# Patient Record
Sex: FEMALE | Race: WHITE
[De-identification: names, ages, dates, MRNs, and addresses within clinical notes are randomized per-mention and may not be internally consistent; named-entity substitution may affect disease eponyms.]

---
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
## 2024-10-12 ENCOUNTER — HOSPITAL ENCOUNTER (OUTPATIENT)
Facility: CLINIC | Age: 19
Discharge: HOME | End: 2024-10-12
Attending: FAMILY MEDICINE
Payer: COMMERCIAL

## 2024-10-12 ENCOUNTER — APPOINTMENT (OUTPATIENT)
Dept: RADIOLOGY | Age: 19
End: 2024-10-12
Attending: NURSE PRACTITIONER
Payer: COMMERCIAL

## 2024-10-12 VITALS
TEMPERATURE: 97.7 F | OXYGEN SATURATION: 98 % | SYSTOLIC BLOOD PRESSURE: 103 MMHG | HEART RATE: 60 BPM | DIASTOLIC BLOOD PRESSURE: 53 MMHG

## 2024-10-12 DIAGNOSIS — M25.572 ACUTE LEFT ANKLE PAIN: Primary | ICD-10-CM

## 2024-10-12 DIAGNOSIS — S93.402A SPRAIN OF LEFT ANKLE, UNSPECIFIED LIGAMENT, INITIAL ENCOUNTER: ICD-10-CM

## 2024-10-12 PROCEDURE — 99204 OFFICE O/P NEW MOD 45 MIN: CPT | Performed by: NURSE PRACTITIONER

## 2024-10-12 PROCEDURE — 73630 X-RAY EXAM OF FOOT: CPT | Mod: LT | Performed by: NURSE PRACTITIONER

## 2024-10-12 PROCEDURE — 73610 X-RAY EXAM OF ANKLE: CPT | Mod: LT | Performed by: NURSE PRACTITIONER

## 2024-10-12 PROCEDURE — S9083 URGENT CARE CENTER GLOBAL: HCPCS | Performed by: NURSE PRACTITIONER

## 2024-10-12 ASSESSMENT — ENCOUNTER SYMPTOMS
RESPIRATORY NEGATIVE: 1
GASTROINTESTINAL NEGATIVE: 1
EYES NEGATIVE: 1
HEMATOLOGIC/LYMPHATIC NEGATIVE: 1
CARDIOVASCULAR NEGATIVE: 1
NEUROLOGICAL NEGATIVE: 1
CONSTITUTIONAL NEGATIVE: 1
PSYCHIATRIC NEGATIVE: 1
MUSCULOSKELETAL NEGATIVE: 1
ALLERGIC/IMMUNOLOGIC NEGATIVE: 1
ENDOCRINE NEGATIVE: 1

## 2024-10-12 NOTE — DISCHARGE INSTRUCTIONS
Wear cam walker boot while ambulating/weight bearing until you are seen by an orthopaedic specialist  Rest affected limb, no heavy pushing, lifting, pulling, repetitive movements  Elevate limb to help with swelling, apply ice intermittently 20 mins and off  You may take over the counter Ibuprofen(advil/motrin) or naproxen (alleve) with food as needed for pain relief  May alternate with acetaminophen ( tylenol). Take as directed on the bottle.  You must follow up with an Orthopaedic specialist   Please make an appointment with the number provided   Seek emergent care for any new or worsening symptoms.

## 2024-10-12 NOTE — ED ATTESTATION NOTE
The patient was solely evaluated and managed by the nurse practitioner.  I was present in the office and available for questions/concerns.  I have reviewed the diagnosis and treatment plan.       Cindi Salazar DO  10/12/24 1926

## 2024-10-12 NOTE — ED PROVIDER NOTES
Emergency Medicine Note  HPI   HISTORY OF PRESENT ILLNESS     HPI Pt is a 19 year old female, who presents with left ankle pain s/p fall last night. Denies striking head. Admits to prior sprain in the past but no fracture. No loss of ROM. No numbness, tingling, paresthesias, weakness. No other sustained trauma. Denies prior history of injuries. Able to weight bear. Took motrin with some relief.       Patient History   PAST HISTORY     Reviewed from Nursing Triage:       No past medical history on file.    Past Surgical History   Procedure Laterality Date    Hand surgery      Reduction mammaplasty         No family history on file.    Social History     Tobacco Use    Smoking status: Some Days     Types: Cigarettes   Substance Use Topics    Alcohol use: Yes    Drug use: Yes     Types: Marijuana         Review of Systems   REVIEW OF SYSTEMS     Review of Systems   Constitutional: Negative.    HENT: Negative.     Eyes: Negative.    Respiratory: Negative.     Cardiovascular: Negative.    Gastrointestinal: Negative.    Endocrine: Negative.    Genitourinary: Negative.    Musculoskeletal: Negative.         Left ankle pain+   Skin: Negative.    Allergic/Immunologic: Negative.    Neurological: Negative.    Hematological: Negative.    Psychiatric/Behavioral: Negative.     All other systems reviewed and are negative.        VITALS     ED Vitals      Date/Time Temp Pulse Resp BP SpO2 [facility]   10/12/24 1521 36.5 °C (97.7 °F) 60 -- 103/53 98 % JH                         Physical Exam   PHYSICAL EXAM     Physical Exam  Vitals reviewed.   Constitutional:       Appearance: Normal appearance.   HENT:      Head: Normocephalic.      Nose: Nose normal.      Mouth/Throat:      Mouth: Mucous membranes are moist.   Eyes:      Pupils: Pupils are equal, round, and reactive to light.   Pulmonary:      Effort: Pulmonary effort is normal.   Musculoskeletal:      Left ankle: Swelling present. No deformity, ecchymosis or lacerations. Tenderness  present. Normal range of motion. Anterior drawer test negative. Normal pulse.      Left Achilles Tendon: Normal. No tenderness or defects. Rae's test negative.      Left foot: Tenderness present.      Comments: Mild anterolateral swelling and bony TTP to lateral and anterior aspect. Mild 5th proximal MT.  No calf TTP. No overlying erythema, ecchymosis, warmth, red streaking. Skin intact. N/V intact. Palpable pulses. <2 cap refill. Full passive ROM.    Neurological:      Mental Status: She is alert.           PROCEDURES     Procedures     DATA     X-RAY ANKLE LEFT 3+ VIEWS  Order: [id number]   Status: Final result       Visible to patient: Yes (not seen)       Next appt: None       Linked study orders: X-RAY FOOT LEFT 3+ VIEWS (Order: [id number])    0 Result Notes  Details    Reading Physician Reading Date Result Priority   Shireen Fabian MD  [phone] 10/12/2024      Narrative & Impression  CLINICAL HISTORY:    left anterolateral ankle tenderness post fall     COMMENT:   3 views of the left ankle and 3 views of the left foot are compared  to 9/16/2023.     No acute fracture or dislocation is identified. Joint spaces appear within  normal limits. The Lisfranc interval is within normal limits. The base of the  fifth metatarsal appears intact.  The ankle mortise appears intact. Bone mineralization appears within normal  limits. An ankle joint effusion is suggested.     --  IMPRESSION:     No evidence of acute fracture or dislocation. Ankle joint effusion suggested.                                        ED Course & MDM   MDM / ED COURSE / CLINICAL IMPRESSION / DISPO     Medical Decision Making    Left ankle pain suggestive of sprain   XR revealed no evidence of acute fracture or dislocation  Advised RICE therapy  Placed on cam walker boot, educated on proper use and wean as tolerated, can use for 1-2 weeks then wean off while weight bearing   Recc OTC Ibuprofen or acetaminophen PRN for pain relief   Referred  to Ortho/PCP if symptoms persists   Return and ED precautions discussed   Pt agreed with plan of care and voiced understanding.       Clinical Impression      Acute left ankle pain   Sprain of left ankle, unspecified ligament, initial encounter     _________________       ED Disposition   Discharge                       Eduarda Hess CRNP  10/12/24 2826

## 2024-10-16 ENCOUNTER — TELEPHONE (OUTPATIENT)
Dept: ORTHOPEDICS | Facility: CLINIC | Age: 19
End: 2024-10-16
Payer: COMMERCIAL

## 2024-10-16 NOTE — TELEPHONE ENCOUNTER
Called patient regarding referral order to schedule appointment.  Call Outcome: No answer, left voicemail with scheduling information.

## 2024-10-27 ENCOUNTER — HOSPITAL ENCOUNTER (EMERGENCY)
Facility: HOSPITAL | Age: 19
Discharge: HOME | End: 2024-10-27
Attending: STUDENT IN AN ORGANIZED HEALTH CARE EDUCATION/TRAINING PROGRAM | Admitting: STUDENT IN AN ORGANIZED HEALTH CARE EDUCATION/TRAINING PROGRAM
Payer: COMMERCIAL

## 2024-10-27 ENCOUNTER — HOSPITAL ENCOUNTER (OUTPATIENT)
Facility: CLINIC | Age: 19
Discharge: LEFT BEFORE DISCHARGE | End: 2024-10-27
Payer: COMMERCIAL

## 2024-10-27 VITALS
OXYGEN SATURATION: 98 % | RESPIRATION RATE: 15 BRPM | HEIGHT: 72 IN | HEART RATE: 74 BPM | SYSTOLIC BLOOD PRESSURE: 136 MMHG | TEMPERATURE: 98 F | DIASTOLIC BLOOD PRESSURE: 60 MMHG | BODY MASS INDEX: 29.12 KG/M2 | WEIGHT: 215 LBS

## 2024-10-27 DIAGNOSIS — S06.0X0A CONCUSSION WITHOUT LOSS OF CONSCIOUSNESS, INITIAL ENCOUNTER: ICD-10-CM
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
## 2024-10-27 PROCEDURE — 99283 EMERGENCY DEPT VISIT LOW MDM: CPT

## 2024-10-27 PROCEDURE — 63700000 HC SELF-ADMINISTRABLE DRUG

## 2024-10-27 RX ORDER — ONDANSETRON 4 MG/1
4 TABLET, ORALLY DISINTEGRATING ORAL ONCE
Status: COMPLETED | OUTPATIENT
Start: 2024-10-27 | End: 2024-10-27

## 2024-10-27 RX ORDER — ONDANSETRON 4 MG/1
4 TABLET, ORALLY DISINTEGRATING ORAL EVERY 8 HOURS PRN
Qty: 8 TABLET | Refills: 0 | Status: SHIPPED | OUTPATIENT
Start: 2024-10-27

## 2024-10-27 RX ADMIN — ONDANSETRON 4 MG: 4 TABLET, ORALLY DISINTEGRATING ORAL at 15:12

## 2024-10-27 NOTE — ED NOTES
Discharge instructions reviewed.  Pt encouraged to follow up with PCP and concussion clinic.  Symptoms reviewed to return to ED.  Medication to be picked up at pharmacy reviewed and education provided.  Pt verbalized understanding of discharge instructions and ambulated to exit.      Winifred Hughes, RN  10/27/24 1339

## 2024-10-27 NOTE — DISCHARGE INSTRUCTIONS
Please return to the emergency department immediately if you develop worst headache of your life, fever, chills, chest pain, shortness of breath, difficulty breathing, new weakness or sensory deficits in the lower extremity, if you pass out or if you experience any other symptom that is concerning to you.    You may take Motrin or Tylenol as per the instructions on the label.    You were prescribed Zofran, you may take this medication as per the instructions label as needed for nausea/vomiting.    Please  follow-up with your primary care provider for reevaluation.    Please call the concussion clinic for follow-up/reevaluation and ongoing management of your symptoms.
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
## 2024-10-28 ASSESSMENT — ENCOUNTER SYMPTOMS
NAUSEA: 1
FEVER: 0
ARTHRALGIAS: 0
DIZZINESS: 0
CHILLS: 0
WOUND: 0
SHORTNESS OF BREATH: 0
HEADACHES: 1
LIGHT-HEADEDNESS: 1
ABDOMINAL PAIN: 0
VOMITING: 0
MYALGIAS: 0

## 2024-10-31 ENCOUNTER — APPOINTMENT (EMERGENCY)
Dept: RADIOLOGY | Facility: HOSPITAL | Age: 19
End: 2024-10-31
Payer: COMMERCIAL

## 2024-10-31 ENCOUNTER — HOSPITAL ENCOUNTER (EMERGENCY)
Facility: HOSPITAL | Age: 19
Discharge: HOME | End: 2024-10-31
Attending: EMERGENCY MEDICINE | Admitting: EMERGENCY MEDICINE
Payer: COMMERCIAL

## 2024-10-31 VITALS
OXYGEN SATURATION: 97 % | WEIGHT: 215 LBS | BODY MASS INDEX: 29.12 KG/M2 | TEMPERATURE: 97.7 F | HEART RATE: 76 BPM | DIASTOLIC BLOOD PRESSURE: 68 MMHG | RESPIRATION RATE: 17 BRPM | HEIGHT: 72 IN | SYSTOLIC BLOOD PRESSURE: 139 MMHG

## 2024-10-31 DIAGNOSIS — R45.89 DYSPHORIC MOOD: ICD-10-CM

## 2024-10-31 DIAGNOSIS — S06.0X0A CONCUSSION WITHOUT LOSS OF CONSCIOUSNESS, INITIAL ENCOUNTER: Primary | ICD-10-CM

## 2024-10-31 LAB
ALBUMIN SERPL-MCNC: 5 G/DL (ref 3.5–5.7)
ALP SERPL-CCNC: 85 IU/L (ref 34–125)
ALT SERPL-CCNC: 10 IU/L (ref 7–52)
AMPHET UR QL SCN: NOT DETECTED
ANION GAP SERPL CALC-SCNC: 10 MEQ/L (ref 3–15)
APAP SERPL-MCNC: <0.1 UG/ML (ref 10–30)
AST SERPL-CCNC: 16 IU/L (ref 13–39)
BARBITURATES UR QL SCN: NOT DETECTED
BASOPHILS # BLD: 0.05 K/UL (ref 0.01–0.1)
BASOPHILS NFR BLD: 0.7 %
BENZODIAZ UR QL SCN: NOT DETECTED
BILIRUB SERPL-MCNC: 1 MG/DL (ref 0.3–1.2)
BUN SERPL-MCNC: 14 MG/DL (ref 7–25)
CALCIUM SERPL-MCNC: 9.4 MG/DL (ref 8.6–10.3)
CANNABINOIDS UR QL SCN: NOT DETECTED
CHLORIDE SERPL-SCNC: 103 MEQ/L (ref 98–107)
CO2 SERPL-SCNC: 27 MEQ/L (ref 21–31)
COCAINE UR QL SCN: NOT DETECTED
CREAT SERPL-MCNC: 0.6 MG/DL (ref 0.6–1.2)
DIFFERENTIAL METHOD BLD: ABNORMAL
EGFRCR SERPLBLD CKD-EPI 2021: >60 ML/MIN/1.73M*2
EOSINOPHIL # BLD: 0.1 K/UL (ref 0.04–0.36)
EOSINOPHIL NFR BLD: 1.5 %
ERYTHROCYTE [DISTWIDTH] IN BLOOD BY AUTOMATED COUNT: 13.5 % (ref 11.7–14.4)
ETHANOL SERPL-MCNC: <10 MG/DL
FENTANYL URINE SCR: NOT DETECTED
GLUCOSE SERPL-MCNC: 119 MG/DL (ref 70–99)
HCT VFR BLD AUTO: 44.1 % (ref 35–45)
HGB BLD-MCNC: 13.8 G/DL (ref 11.8–15.7)
IMM GRANULOCYTES # BLD AUTO: 0.02 K/UL (ref 0–0.08)
IMM GRANULOCYTES NFR BLD AUTO: 0.3 %
LYMPHOCYTES # BLD: 1.86 K/UL (ref 1.2–3.5)
LYMPHOCYTES NFR BLD: 27.8 %
MCH RBC QN AUTO: 27.5 PG (ref 28–33.2)
MCHC RBC AUTO-ENTMCNC: 31.3 G/DL (ref 32.2–35.5)
MCV RBC AUTO: 87.8 FL (ref 83–98)
MONOCYTES # BLD: 0.32 K/UL (ref 0.28–0.8)
MONOCYTES NFR BLD: 4.8 %
NEUTROPHILS # BLD: 4.35 K/UL (ref 1.7–7)
NEUTS SEG NFR BLD: 64.9 %
NRBC BLD-RTO: 0 %
OPIATES UR QL SCN: NOT DETECTED
PCP UR QL SCN: NOT DETECTED
PLATELET # BLD AUTO: 249 K/UL (ref 150–369)
PMV BLD AUTO: 10.9 FL (ref 9.4–12.3)
POTASSIUM SERPL-SCNC: 4 MEQ/L (ref 3.5–5.1)
PROT SERPL-MCNC: 7.6 G/DL (ref 6–8.2)
RBC # BLD AUTO: 5.02 M/UL (ref 3.93–5.22)
SALICYLATES SERPL-MCNC: <1.5 MG/DL
SODIUM SERPL-SCNC: 140 MEQ/L (ref 136–145)
WBC # BLD AUTO: 6.7 K/UL (ref 3.8–10.5)

## 2024-10-31 PROCEDURE — 99284 EMERGENCY DEPT VISIT MOD MDM: CPT | Mod: 25

## 2024-10-31 PROCEDURE — 70450 CT HEAD/BRAIN W/O DYE: CPT

## 2024-10-31 PROCEDURE — 96375 TX/PRO/DX INJ NEW DRUG ADDON: CPT

## 2024-10-31 PROCEDURE — 3E033GC INTRODUCTION OF OTHER THERAPEUTIC SUBSTANCE INTO PERIPHERAL VEIN, PERCUTANEOUS APPROACH: ICD-10-PCS | Performed by: EMERGENCY MEDICINE

## 2024-10-31 PROCEDURE — G0480 DRUG TEST DEF 1-7 CLASSES: HCPCS

## 2024-10-31 PROCEDURE — 36415 COLL VENOUS BLD VENIPUNCTURE: CPT

## 2024-10-31 PROCEDURE — 63600000 HC DRUGS/DETAIL CODE: Mod: JZ

## 2024-10-31 PROCEDURE — 85025 COMPLETE CBC W/AUTO DIFF WBC: CPT

## 2024-10-31 PROCEDURE — 96374 THER/PROPH/DIAG INJ IV PUSH: CPT

## 2024-10-31 PROCEDURE — 80307 DRUG TEST PRSMV CHEM ANLYZR: CPT

## 2024-10-31 PROCEDURE — 80053 COMPREHEN METABOLIC PANEL: CPT

## 2024-10-31 RX ORDER — DIPHENHYDRAMINE HCL 50 MG/ML
25 VIAL (ML) INJECTION ONCE
Status: COMPLETED | OUTPATIENT
Start: 2024-10-31 | End: 2024-10-31

## 2024-10-31 RX ORDER — METOCLOPRAMIDE HYDROCHLORIDE 5 MG/ML
10 INJECTION INTRAMUSCULAR; INTRAVENOUS ONCE
Status: COMPLETED | OUTPATIENT
Start: 2024-10-31 | End: 2024-10-31

## 2024-10-31 RX ADMIN — DIPHENHYDRAMINE HYDROCHLORIDE 25 MG: 50 INJECTION INTRAMUSCULAR; INTRAVENOUS at 19:42

## 2024-10-31 RX ADMIN — METOCLOPRAMIDE 10 MG: 5 INJECTION, SOLUTION INTRAMUSCULAR; INTRAVENOUS at 19:43

## 2024-10-31 ASSESSMENT — COGNITIVE AND FUNCTIONAL STATUS - GENERAL
SPEECH: REGULAR
CONCENTRATION: WNL
EYE_CONTACT: WNL
ORIENTATION: FULLY ORIENTED
PERCEPTUAL FUNCTION: NORMAL
SLEEP_WAKE_CYCLE: INCREASED
APPETITE: NO CHANGE
INSIGHT: INTACT
JUDGEMENT: INTACT
REMOTE MEMORY: WNL
ATTENTION: WNL
MOOD: DEPRESSED
THOUGHT_CONTENT: APPROPRIATE
AFFECT: FLAT
RECENT MEMORY: WNL
DELUSIONS: NONE OR AGE APPROPRIATE
AROUSAL LEVEL: ALERT
APPEARANCE: WELL GROOMED
PSYCHOMOTOR FUNCTIONING: WNL
IMPULSE CONTROL: IMPAIRED, MINIMALLY
LIBIDO: NO CHANGE
THOUGHT_PROCESS: WNL

## 2024-10-31 ASSESSMENT — ENCOUNTER SYMPTOMS
FEVER: 0
CHILLS: 0
HEADACHES: 1
DIZZINESS: 0
ABDOMINAL PAIN: 0
VOMITING: 0
SPEECH DIFFICULTY: 0
NAUSEA: 1
NUMBNESS: 0
LIGHT-HEADEDNESS: 0
PHOTOPHOBIA: 1
SHORTNESS OF BREATH: 0

## 2024-10-31 NOTE — Clinical Note
Arlet Bush was seen and treated in our emergency department on 10/31/2024.  Arlet Bush may return to school on 11/11/2024.  She cannot return to school until she is evaluated by the [facility] program    If you have any questions or concerns, please don't hesitate to call.      Kira Vences PA C

## 2024-10-31 NOTE — ED ATTESTATION NOTE
I have personally seen and examined the patient.  I reviewed and agree with physician assistant / nurse practitioner’s assessment and plan of care, with the following exceptions: None    I personally performed the key components of the encounter and provided a substantive portion of the care and medical decision making    My examination, assessment, and plan of care of Arlet Bush is as follows:     Pt hit her head 5 days ago. Since then, co headaches, trouble concentrating, brain fog, nausea.   Sent over from [facility]  Exam: awake, alert. NAD. NCAT. CN intact.  RRR, CTA, abd soft, nt.       Sterious, Keith CRUZ MD  10/31/24 2898
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
## 2024-11-01 NOTE — DISCHARGE INSTRUCTIONS
Pt was recommended to follow up with her therapist Friday for their 1st session and then after she will attend the session x2 a week. Also writer provided crisis listing, local providers such as therapists and psychiatrists resources. Pt completed the safety plan.     Please schedule an appointment with the [place] concussion program for further evaluation and management.    Return to the ED for worsening of symptoms or any problems or concerns.  It is very important to follow up with your healthcare provider for re-evaluation.

## 2024-11-12 ENCOUNTER — TRANSCRIBE ORDERS (OUTPATIENT)
Dept: REGISTRATION | Facility: REHABILITATION | Age: 19
End: 2024-11-12

## 2024-11-12 DIAGNOSIS — S06.0X0S CONCUSSION WITHOUT LOSS OF CONSCIOUSNESS, SEQUELA (CMS/HCC): Primary | ICD-10-CM

## 2024-11-12 DIAGNOSIS — R51.9 HEADACHE: ICD-10-CM

## 2024-11-18 ENCOUNTER — HOSPITAL ENCOUNTER (OUTPATIENT)
Dept: OCCUPATIONAL THERAPY | Age: 19
Setting detail: THERAPIES SERIES
Discharge: HOME | End: 2024-11-18
Attending: PHYSICAL MEDICINE & REHABILITATION
Payer: COMMERCIAL

## 2024-11-18 DIAGNOSIS — S06.0X0S CONCUSSION WITHOUT LOSS OF CONSCIOUSNESS, SEQUELA (CMS/HCC): Primary | ICD-10-CM

## 2024-11-18 DIAGNOSIS — R51.9 HEADACHE: ICD-10-CM

## 2024-11-18 DIAGNOSIS — Z78.9 IMPAIRED INSTRUMENTAL ACTIVITIES OF DAILY LIVING (IADL): ICD-10-CM

## 2024-11-18 PROCEDURE — 97166 OT EVAL MOD COMPLEX 45 MIN: CPT | Mod: GO

## 2024-11-18 RX ORDER — BUSPIRONE HYDROCHLORIDE 10 MG/1
10 TABLET ORAL 2 TIMES DAILY
COMMUNITY

## 2024-11-18 NOTE — PROGRESS NOTES
Occupational Therapy Evaluation    [facility] OP Therapy Fax: [phone]    OT EVALUATION FOR OUTPATIENT THERAPY    Patient: Arlet Bush   MRN: [id number]  : 2005 19 y.o.    Referring Physician: Judson Larsen DO  Date of Visit: 2024    Certification Dates:  24 through 24    Recommended Frequency & Duration:  1 time/week for up to 1 week        Diagnosis:   1. Concussion without loss of consciousness, sequela (CMS/HCC)    2. Headache    3. Impaired instrumental activities of daily living (IADL)        History of Present Illness:  Concussion     Chief Complaints:   Chief Complaint   Patient presents with    OT Initial Eval    Concussion     Difficulty Performing Work/school Tasks       Precautions:      Past Medical History: No past medical history on file.    Past Surgical History:   Past Surgical History   Procedure Laterality Date    Hand surgery      Reduction mammaplasty      Reduction mammaplasty      Tendon repair         LEARNING ASSESSMENT    Assessment completed: Yes    Learner name:  Arlet     Learner: Patient    Learning Barriers:  Learning barriers: No Barriers    Preferred Language: English     Needed: No    Education Provided:   Method: Discussion  Readiness: acceptance  Response: Verbalizes understanding      CO-LEARNER ASSESSMENT:    Completed: No      Welcome letter discussed: Yes Patient provided with Welcome Letter, which includes attendance policy. Provided education regarding cancellation and no-show policy. Education regarding the importance of participation and regular attendance to maximize goal attainment.     OBJECTIVE MEASUREMENTS/DATA:    Time In Session:  Start Time: 0710  Stop Time: 0800  Time Calculation (min): 50 min   Assessment - 24 0709          Assessment    Plan of Care reviewed and patient/family in agreement Yes     System Pathology/Pathophysiology Noted neuromuscular     Functional Limitations in Following Categories  community/leisure;school     Problem List: Occupational Therapy functional vision;impaired cognition     Demonstrates Need for Referral to Another Service: Occupational Therapy neuropsychology services     Actions taken Discussed potential recommendation of Neuro Psyh services due to pt report of continued cognitive deficits. Pt educated on recommendation to follow up with PCP and/or referring provider for futher evaluation pending progress of PCS symptoms following upcoming completion of college finals.     Clinical Assessment Arlet Bush is a 19 year old female referred to [facility] OP OT services by Dr. Larsen following a concussion diagnosis occurring on 10/26/24. Pt is a full time student at [employer]. 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 Pt has returned to courses with accommodations. Pt reports limited reading tolerance of 10 minutes with difficulty processing after this duration. Pt denies concern with screen use. Subjectively, assessed on the SCOAT6 pt reporting worsening of symptoms with physical and mental activity. Pt reporting a total of 19/23 symptoms and 64/138 symptoms severity. Pt reporting 78% feeling perfectly normal. Pt shares main goals to improve complex thinking and complex processing with school tasks. Objectively, pt demonstrates WNL score of visual reaction speed of 0.75 seconds assessed on the Dynavision board (NORM = 0.75 seconds). Pt demonstrates WNL of short saccadic speed both horizontal and vertical. WNL of Mohamud Devick assessment of 44.61 seconds (NORM = 0.52 seconds).  Due to time constraints unable to complete formal oculormotor AROM assessment and eye teaming evaluation. Upon recommendation and discussion, OP OT services is not recommended at this time due to pt schedule barriers. Pt will be away for the full week next week and will return home in the middle of December to [place]. Pt will be preparing for finals through the initial weeks of December and attendance to OP therapies  is limited. Education provided on Neuro Psyh evaluation for further evaluation of Cognitive deficits and potential extension of accommodations for school. Pt verbalizes agreement with recommendation at this time.     Plan and Recommendations Due to pt schedule barries OP OT services is not recommended at this time.                    General Information - 11/18/24 0709          Session Details    Document Type initial evaluation     Mode of Treatment individual therapy        General Information    Onset of Illness/Injury or Date of Surgery 10/26/24     Referring Physician Dr. Larsen     History of present illness/functional impairment Arlet Bush is a 19 year old female referred to [facility] OP OT services by Dr. Larsen following a concussion diagnosis occurring on 10/26/24. Pt presented to [facility] ED on 10/27/24  and 10/31/24. Pt reporting had hit her head on a metal furnace (fire place) and against the wall while drinking. Pt reports did not lose consciousness. Pt reports returned to the ED on 10/31/24 following worsening symptoms of headache and nausea. [facility] [address] [place] who diagnosis pt with a concussion. Per medical history following head injury pt reported increased thoughts of self-harm and suicidal ideation without intention. Patient reported history of this in the past however has not had any self-harm in over 1 year. On evaluation today, pt reports improvement in thoughts and denies plan. Pt with history of chronic paraesthesias and is followed by neurology Dr. Kaiser Francisco at [facility] in [place] since 2023. Pt with several MRIs in the past. Pt presents to OP OT evaluation today to improve ongoing PCS symptoms.     Patient/Family/Caregiver Comments/Observations Pt presented 10 minutes late to OT evaluation. Pt presented with partner, Grant.        Behavioral Health Related Communication    Suicidal Ideation No                    Pain/Vitals - 11/18/24 3166           Pain Assessment    Currently in pain No/Denies                    OT - 11/18/24 0709          Occupational Therapy Encounter Type Details    Occupational Therapy Specialty [facility] OT        OT Frequency and Duration    Frequency of treatment 1 time/week     OT Duration 1 week     OT Cert From 11/18/24     OT Cert To 11/18/24     Date OT POC was sent to provider 11/18/24     Signed OT Plan of Care received?  No                    Falls/Food Screening - 11/18/24 0709          Initial Falls Assessment    One or more falls in the last year Yes     How many times 2 or more   Pt reports history of Paresthesia. Pt shares is being seen by Neurology Dr. Kaiser Francisco. Pt reports causes falls, poor balance, decreased UE/LE sensation.    Was the patient injured in any fall Yes   Ankle sprain, pt denies head injury from falls       Food Insecurity    Within the past 12 months, you worried that your food would run out before you got the money to buy more. Never true     Within the past 12 months, the food you bought just didn't last and you didn't have money to get more. Never true                     Living Environment    Living Environment - 11/18/24 0709          Living Environment    People in Home alone     Living Arrangements other (see comments)   Dorm    Living Environment Comment Pt lives alone in a Dorm on campus. Pt is from [place].                   Reading and Writing     Reading and Writing - 11/18/24 0709          Reading and Writing Assessment    Reading (comments) Pt reads audio (listens for one hour). Pt reporting difficulty processing after 10 minutes. Pt denies issues with screen tolerance.                   BADL/IADL    BADL/IADL - 11/18/24 0709          BADL Interventions Assessment    Upper Body Dressing Independent     Lower Body Dressing Independent     Bathing Independent     Toileting Independent     Grooming Independent     Eating Independent        IADL/Home Interventions Assessment    Driving and  community mobility Independent     Driving and community mobility comments Pt denies issues with driving.                   Work and School     Work and School Assessment - 11/18/24 0709          Work/School/Leisure Assessment    Job Performance (comments) Pt resumed school 11/4 and sat in on classes. Pt has accomodations through [employer]. 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 Pt had to withdraw from one class.     Leisure / Social Participation (comments) Cooking, music                   Vision     Vision - 11/18/24 0709          Vision Assessment    Impact of Vision Impairment on Function Pt wears corrective lens (glasses) pt reports has miss placed and has not been wearing. Pt reports last eye exam October, 2023. Pt reports with movement objects will double.        Saccadic Fixation Speed    Horizontal Saccades H1 5.3 Seconds     Horizontal Saccades H2 5.43 Seconds     Horizontal Saccades H3 5.56 Seconds     Horizontal Saccades H4 5.47 Seconds     Horizontal Saccades trials average 5.44 Seconds     Vertical Saccades V1 4.94 Seconds     Vertical Saccades V2 4.28 Seconds     Vertical Saccades V3 5.14 Seconds     Vertical Saccades V4 4.88 Seconds     Vertical Saccades trials average 4.81 Seconds     Mohamud Devick Test #1 (seconds) 14.36 Seconds     Mohamud Devick Test #2 (seconds) 14.76 Seconds     Mohamud Devick Test #3 (seconds) 15.49 Seconds     Mohamud Devick Total Time 44.61 Seconds     Mohamud Devick Errors #1 0     Mohamud Devick Errors #2 1     Mohamud Devick Errors #3 0     Mohamud Devick Total Errors 1        Visual Reaction Timing    Dynavision B 5 sec     Dynavision Score (Mode B, 5 sec light timer) Targets 79     Dynavision Score Avg response time 0.75 Seconds     Dynavision Score Overall WFL        Symptoms and Outcome Measures    SCOAT-6: Total number of symptoms (out of 23) 19 /23     SCOAT-6: Symptom severity score (out of 138) 64 /138     SCOAT-6: Do your symptoms  get worse with physical activity? Yes     SCOAT-6: Do your symptoms get worse with mental activity? Yes     SCOAT-6: If 100% is feeling perfectly normal, what percent of normal do you feel? 78 out of 100%                     GOALS:     Goals    None         TREATMENT PLAN:      VISION/CONCUSSION OT FLOW SHEET    OT Vision/mTBI  EXERCISES CURRENT SESSION TIME   NEURO RE-ED TOTAL TIME FOR SESSION Not performed   Dynavision    VTS3/VTS4    CPT    BITs    NVR    Saccadic Fixation    Ocular Motor Control    Visual Tracing    3D Tracking    Visual Perception    Convergence/Divergence    Accommodation    Visual Stimulation    THER ACT TOTAL TIME FOR SESSION 0-7 Minutes   Pain, Vitals, Meds, Etc. Patient provided with Welcome Letter, which includes attendance policy. Provided education regarding cancellation and no-show policy. Education regarding the importance of participation and regular attendance to maximize goal attainment.      HEP    Visual Endurance     Work/School Simulation     SELF CARE TOTAL TIME FOR SESSION Not performed   PCS Symptom Management/Education    ADL/IADLs                                                                          This 19 y.o. year old female presents to OT with above stated diagnosis. Occupational Therapy evaluation reveals functional vision, impaired cognition resulting in community/leisure, school limitations. Arlet Bush will benefit from skilled OT services to address limitation, work towards rehab and patient goals and maximize PLOF of chosen ADLs.     Planned Services: The patient’s treatment will include  , .    Yari Cornelius, OT

## 2024-11-18 NOTE — OP OT TREATMENT LOG
VISION/CONCUSSION OT FLOW SHEET    OT Vision/mTBI  EXERCISES CURRENT SESSION TIME   NEURO RE-ED TOTAL TIME FOR SESSION Not performed   Dynavision    VTS3/VTS4    CPT    BITs    NVR    Saccadic Fixation    Ocular Motor Control    Visual Tracing    3D Tracking    Visual Perception    Convergence/Divergence    Accommodation    Visual Stimulation    THER ACT TOTAL TIME FOR SESSION 0-7 Minutes   Pain, Vitals, Meds, Etc. Patient provided with Welcome Letter, which includes attendance policy. Provided education regarding cancellation and no-show policy. Education regarding the importance of participation and regular attendance to maximize goal attainment.      HEP    Visual Endurance     Work/School Simulation     SELF CARE TOTAL TIME FOR SESSION Not performed   PCS Symptom Management/Education    ADL/IADLs

## (undated) DEVICE — DRSG MAMMARY SUPPORT MED SIZE 2

## (undated) DEVICE — STAPLER SKIN VISI-STAT 35 WIDE

## (undated) DEVICE — SUT PLAIN GUT FAST ABSORBING 5-0 PC-1

## (undated) DEVICE — DRSG MASTISOL

## (undated) DEVICE — PACK BREAST RECONSTRUCTION

## (undated) DEVICE — ONETRAC LIGHTED RETRACTOR 90 X 22MM DISP

## (undated) DEVICE — SOL IRR POUR H2O 250ML

## (undated) DEVICE — LAP PAD 18 X 18"

## (undated) DEVICE — GOWN TRIMAX LG

## (undated) DEVICE — ABDOMINAL BINDER MED/LG 9" X 36"-64"

## (undated) DEVICE — MARKING PEN W RULER

## (undated) DEVICE — DRSG DERMABOND PRINEO 60CM

## (undated) DEVICE — ELCTR BOVIE TIP BLADE INSULATED 2.75" EDGE

## (undated) DEVICE — POSITIONER FOAM EGG CRATE ULNAR 2PCS (PINK)

## (undated) DEVICE — SOL IRR POUR NS 0.9% 500ML

## (undated) DEVICE — GLV 6.5 PROTEXIS (WHITE)

## (undated) DEVICE — DRAPE 1/2 SHEET 40X57"

## (undated) DEVICE — BLADE SCALPEL SAFETYLOCK #10

## (undated) DEVICE — SUT POLYSORB 3-0 30" V-20 UNDYED

## (undated) DEVICE — DRSG MAMMARY SUPPORT LG SIZE 4

## (undated) DEVICE — SUT PDS II 0 27" OS-6

## (undated) DEVICE — DRSG CURITY GAUZE SPONGE 4 X 4" 12-PLY

## (undated) DEVICE — DRAPE IOBAN 23" X 23"

## (undated) DEVICE — SUT TICRON 0 30" GS-22

## (undated) DEVICE — GLV 7 PROTEXIS (WHITE)

## (undated) DEVICE — DRAPE INSTRUMENT POUCH 6.75" X 11"

## (undated) DEVICE — ABDOMINAL BINDER MED/LG 12" X 45"-62"

## (undated) DEVICE — DRSG MAMMARY SUPPORT SM SIZE 1

## (undated) DEVICE — ABDOMINAL BINDER MED/LG 12" X 36"-54"

## (undated) DEVICE — DRSG MAMMARY SUPPORT MED SIZE 3

## (undated) DEVICE — SUT MONOCRYL 3-0 27" PS-2 UNDYED

## (undated) DEVICE — DRAPE TOWEL BLUE 17" X 24"

## (undated) DEVICE — MEDICATION LABELS W MARKER

## (undated) DEVICE — WARMING BLANKET FULL UNDERBODY

## (undated) DEVICE — GLV 7.5 PROTEXIS (WHITE)

## (undated) DEVICE — DRSG STERISTRIPS 0.5 X 4"

## (undated) DEVICE — DRSG TEGADERM 4X4.75"

## (undated) DEVICE — SUT QUILL MONODERM 3-0 30CM PS-2

## (undated) DEVICE — ELCTR BOVIE TIP BLADE INSULATED 6.5" EDGE

## (undated) DEVICE — GLV 6 PROTEXIS (WHITE)

## (undated) DEVICE — SYR LUER LOK 50CC

## (undated) DEVICE — BLADE SCALPEL SAFETYLOCK #15

## (undated) DEVICE — DRSG MAMMARY SUPPORT XL SIZE 5

## (undated) DEVICE — SUT SOFSILK 2-0 18" C-23

## (undated) DEVICE — DRSG XEROFORM 1 X 8"

## (undated) DEVICE — VENODYNE/SCD SLEEVE CALF MEDIUM

## (undated) DEVICE — SPECIMEN CONTAINER 100ML

## (undated) DEVICE — DRSG COMBINE 5X9"

## (undated) DEVICE — DRAPE SHOWER CURTAIN ISOLATION

## (undated) DEVICE — DRAIN BLAKE 15FR BARD CHANNEL